# Patient Record
Sex: MALE | Race: WHITE
[De-identification: names, ages, dates, MRNs, and addresses within clinical notes are randomized per-mention and may not be internally consistent; named-entity substitution may affect disease eponyms.]

---

## 2019-06-07 ENCOUNTER — HOSPITAL ENCOUNTER (EMERGENCY)
Dept: HOSPITAL 11 - JP.ED | Age: 64
Discharge: HOME | End: 2019-06-07
Payer: COMMERCIAL

## 2019-06-07 DIAGNOSIS — E78.00: ICD-10-CM

## 2019-06-07 DIAGNOSIS — I10: ICD-10-CM

## 2019-06-07 DIAGNOSIS — Z87.891: ICD-10-CM

## 2019-06-07 DIAGNOSIS — S30.861A: Primary | ICD-10-CM
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
## 2019-06-07 NOTE — EDM.PDOC
ED HPI GENERAL MEDICAL PROBLEM





- General


Chief Complaint: Bite:Animal, Insect


Stated Complaint: NOT FEELING WELL/CONCERNED ABOUT TICK BITE


Time Seen by Provider: 06/07/19 10:46


Source of Information: Reports: Patient


History Limitations: Reports: No Limitations





- History of Present Illness


INITIAL COMMENTS - FREE TEXT/NARRATIVE: 


64-year-old gentleman presents emergency department today complaint of tick bite

, he found an engorged tick in his navel he has had some vague symptomology 

with neck stiffness near syncope over the last couple weeks he was last here 

May 10


  ** Posterior Neck


Pain Score (Numeric/FACES): 5





- Related Data


 Allergies











Allergy/AdvReac Type Severity Reaction Status Date / Time


 


No Known Allergies Allergy   Verified 06/07/19 10:40














Past Medical History


HEENT History: Reports: Impaired Vision


Cardiovascular History: Reports: High Cholesterol, Hypertension


Hematologic History: Reports: Other (See Below)


Other Hematologic History: sepsis in jan. 2019





- Infectious Disease History


Infectious Disease History: Reports: Chicken Pox





Social & Family History





- Tobacco Use


Smoking Status *Q: Former Smoker


Used Tobacco, but Quit: Yes


Month/Year Tobacco Last Used: years ago





- Caffeine Use


Caffeine Use: Reports: Coffee





- Recreational Drug Use


Recreational Drug Use: No





ED ROS GENERAL





- Review of Systems


Review Of Systems: See Below


Constitutional: Reports: No Symptoms


Respiratory: Reports: No Symptoms


Cardiovascular: Reports: No Symptoms


Musculoskeletal: Reports: Neck Pain, Muscle Stiffness





ED EXAM, ANIMAL BITE





- Physical Exam


Exam: See Below


Text/Narrative:: 





Examination the [employer] he does have a specimen present it is engorged deer tick 

however examination of the [employer] there is tick remnants remaining this is 

removed with a combination of nasal speculum, 11 blade, splinter forceps, 

alligator forceps


Exam Limited By: No Limitations


General Appearance: Alert, WD/WN, No Apparent Distress





Course





- Vital Signs


Last Recorded V/S: 





 Last Vital Signs











Temp  99.4 F   06/07/19 10:32


 


Pulse  108 H  06/07/19 10:32


 


Resp  16   06/07/19 10:32


 


BP  192/107 H  06/07/19 10:32


 


Pulse Ox  98   06/07/19 10:32














Departure





- Departure


Time of Disposition: 11:11


Disposition: Home, Self-Care 01


Condition: Fair


Clinical Impression: 


Tick bite


Qualifiers:


 Encounter type: initial encounter Qualified Code(s): W57.XXXA - Bitten or 

stung by nonvenomous insect and other nonvenomous arthropods, initial encounter








- Discharge Information


Referrals: 


PCP,None [Primary Care Provider] - 


Additional Instructions: 


Take full course of antibiotics,  Please followup with your primary care 

provider in 7-10 days if not better, please call return to the emergency 

department with worsening of symptoms.





- Assessment/Plan


Plan: 





Assessment





Acuity = acute





Site and laterality = deer tick bites umbilicus  





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  





Manifestations = none  





Location of injury =  Home





Lab values = none  





Plan


[Like to treat empirically doxycycline 100 mg by mouth twice a day 21 days 

follow-up primary care in 7-10 days if no improvement]  

















 This note was dictated using dragon voice recognition software please call 

with any questions on syntax or grammar.